# Patient Record
Sex: FEMALE | Race: OTHER | URBAN - METROPOLITAN AREA
[De-identification: names, ages, dates, MRNs, and addresses within clinical notes are randomized per-mention and may not be internally consistent; named-entity substitution may affect disease eponyms.]

---

## 2021-12-26 ENCOUNTER — OFFICE VISIT (OUTPATIENT)
Dept: URGENT CARE | Facility: CLINIC | Age: 36
End: 2021-12-26
Payer: COMMERCIAL

## 2021-12-26 VITALS
HEIGHT: 66 IN | HEART RATE: 66 BPM | DIASTOLIC BLOOD PRESSURE: 70 MMHG | BODY MASS INDEX: 23.3 KG/M2 | OXYGEN SATURATION: 100 % | WEIGHT: 145 LBS | TEMPERATURE: 98.2 F | SYSTOLIC BLOOD PRESSURE: 117 MMHG

## 2021-12-26 DIAGNOSIS — B34.9 VIRAL INFECTION: Primary | ICD-10-CM

## 2021-12-26 PROCEDURE — 99213 OFFICE O/P EST LOW 20 MIN: CPT | Performed by: PHYSICIAN ASSISTANT

## 2021-12-26 PROCEDURE — 87636 SARSCOV2 & INF A&B AMP PRB: CPT | Performed by: PHYSICIAN ASSISTANT

## 2021-12-28 LAB
FLUAV RNA RESP QL NAA+PROBE: NEGATIVE
FLUBV RNA RESP QL NAA+PROBE: NEGATIVE
SARS-COV-2 RNA RESP QL NAA+PROBE: POSITIVE

## 2022-05-21 ENCOUNTER — APPOINTMENT (OUTPATIENT)
Dept: RADIOLOGY | Facility: CLINIC | Age: 37
End: 2022-05-21
Payer: COMMERCIAL

## 2022-05-21 ENCOUNTER — OFFICE VISIT (OUTPATIENT)
Dept: URGENT CARE | Facility: CLINIC | Age: 37
End: 2022-05-21
Payer: COMMERCIAL

## 2022-05-21 VITALS
DIASTOLIC BLOOD PRESSURE: 70 MMHG | TEMPERATURE: 97.9 F | OXYGEN SATURATION: 100 % | HEART RATE: 89 BPM | SYSTOLIC BLOOD PRESSURE: 110 MMHG | RESPIRATION RATE: 18 BRPM

## 2022-05-21 DIAGNOSIS — M25.571 ACUTE RIGHT ANKLE PAIN: ICD-10-CM

## 2022-05-21 DIAGNOSIS — S93.401A SPRAIN OF RIGHT ANKLE, UNSPECIFIED LIGAMENT, INITIAL ENCOUNTER: ICD-10-CM

## 2022-05-21 DIAGNOSIS — M25.571 ACUTE RIGHT ANKLE PAIN: Primary | ICD-10-CM

## 2022-05-21 DIAGNOSIS — S63.502A SPRAIN OF LEFT WRIST, INITIAL ENCOUNTER: ICD-10-CM

## 2022-05-21 PROCEDURE — 73610 X-RAY EXAM OF ANKLE: CPT

## 2022-05-21 PROCEDURE — 73630 X-RAY EXAM OF FOOT: CPT

## 2022-05-21 PROCEDURE — 99213 OFFICE O/P EST LOW 20 MIN: CPT

## 2022-05-21 NOTE — PROGRESS NOTES
[facility]        NAME: Brigette Schumacher is a 29 y o  female  : 1985    MRN: [id number]  DATE: May 21, 2022  TIME: 11:16 AM    Assessment and Plan   Acute right ankle pain [M25 571]  1  Acute right ankle pain  XR ankle 3+ vw right    XR foot 3+ vw right    XR foot 3+ vw right   2  Sprain of left wrist, initial encounter     3  Sprain of right ankle, unspecified ligament, initial encounter       Xray right foot/ankle done today  Negative for fracture on my read  Will await final radiology read  Offered to xray left wrist, but patient will hold off today as she has normal ROM, no bony tenderness, and pain is improving  Patient Instructions     Rest and elevation  As symptoms improve you may use your splint less and apply an elastic bandage(ACE wrap) for support  Do not sleep with elastic bandage on  Ice for 15min, 3-4x daily  Tylenol OTC  PCP follow up  Return to clinic or follow up with Orthopedics with new or worsening symptoms such as pain, swelling, loss of color or sensation  Chief Complaint     Chief Complaint   Patient presents with    Ankle Injury     Happened on Tuesday while on vacation  Iced did not seek tx  + edema, + ecchymosis         History of Present Illness       Patient presents today after falling and injuring her right foot/ankle and left wrist 3 days ago  Patient unsure if she twisted her ankle when she fell  She also landed with her hand out injuring her left wrist  She has bruising to her inner ankle, and the hsieh aspect of her left wrist  She denies history of previous fractures to the left wrist or right ankle/foot  She states the pain is improving to her L wrist  She is able to bear some weight to her R ankle, but with pain  Since the injury she states the pain and swelling have improved slightly  Review of Systems   Review of Systems   Constitutional: Negative for chills and fever  Musculoskeletal: Positive for arthralgias and joint swelling  Negative for gait problem  Skin: Positive for color change  Negative for rash and wound  Neurological: Negative for weakness and numbness  All other systems reviewed and are negative  Current Medications     No current outpatient medications on file  Current Allergies     Allergies as of 05/21/2022    (No Known Allergies)            The following portions of the patient's history were reviewed and updated as appropriate: allergies, current medications, past family history, past medical history, past social history, past surgical history and problem list      History reviewed  No pertinent past medical history  History reviewed  No pertinent surgical history  Family History   Problem Relation Age of Onset    Hypertension Mother     Hypertension Father          Medications have been verified  Objective   /70   Pulse 89   Temp 97 9 °F (36 6 °C)   Resp 18   LMP 05/06/2022   SpO2 100%        Physical Exam     Physical Exam  Vitals and nursing note reviewed  Constitutional:       General: She is not in acute distress  Appearance: She is well-developed  She is not ill-appearing  HENT:      Nose: Nose normal    Eyes:      Pupils: Pupils are equal, round, and reactive to light  Cardiovascular:      Rate and Rhythm: Normal rate and regular rhythm  Pulses: Normal pulses  Heart sounds: Normal heart sounds  No murmur heard  No friction rub  No gallop  Pulmonary:      Effort: Pulmonary effort is normal  No respiratory distress  Breath sounds: Normal breath sounds  No wheezing or rales  Chest:      Chest wall: No tenderness  Abdominal:      General: Bowel sounds are normal    Musculoskeletal:         General: Tenderness (left wrist and right ankle) and signs of injury (left wrist and right ankle) present  No deformity  Normal range of motion  Right wrist: Normal       Left wrist: No swelling, tenderness or bony tenderness  Normal range of motion  Normal pulse  Arms:       Cervical back: Normal range of motion  Right ankle: Swelling and ecchymosis present  Tenderness present over the medial malleolus  Normal range of motion  Anterior drawer test negative  Normal pulse  Left ankle: Normal       Right foot: Bony tenderness present  Comments: LE MS 5/5 bilaterally  Negative talar tilt, anterior drawer and squeeze test    Skin:     General: Skin is warm  Findings: Bruising (palmar aspest of L wrist, and inner R ankle) present  No erythema or rash  Neurological:      Mental Status: She is alert and oriented to person, place, and time  Sensory: Sensation is intact  Deep Tendon Reflexes: Reflexes are normal and symmetric  Comments: LE light touch sensation intact bilaterally  Psychiatric:         Mood and Affect: Mood normal          Behavior: Behavior normal          Thought Content:  Thought content normal          Judgment: Judgment normal

## 2022-05-21 NOTE — PATIENT INSTRUCTIONS
Rest and elevation  As symptoms improve you may use your splint less and apply an elastic bandage(ACE wrap) for support  Do not sleep with elastic bandage on  Ice for 15min, 3-4x daily  Tylenol OTC  PCP follow up  Return to clinic or follow up with Orthopedics with new or worsening symptoms such as pain, swelling, loss of color or sensation  Ankle Sprain   AMBULATORY CARE:   An ankle sprain  happens when 1 or more ligaments in your ankle joint stretch or tear  Ligaments are tough tissues that connect bones  Ligaments support your joints and keep your bones in place  Common symptoms include the following:   Trouble moving your ankle or foot    Pain when you touch or put weight on your ankle    Bruised, swollen, or misshapen ankle    Seek care immediately if:   You have severe pain in your ankle  Your foot or toes are cold or numb  Your ankle becomes more weak or unstable (wobbly)  You are unable to put any weight on your ankle or foot  Your swelling has increased or returned  Call your doctor if:   Your pain does not go away, even after treatment  You have questions or concerns about your condition or care  Treatment:   Medicines:      NSAIDs , such as ibuprofen, help decrease swelling, pain, and fever  This medicine is available with or without a doctor's order  NSAIDs can cause stomach bleeding or kidney problems in certain people  If you take blood thinner medicine, always ask your healthcare provider if NSAIDs are safe for you  Always read the medicine label and follow directions  Acetaminophen  decreases pain and fever  It is available without a doctor's order  Ask how much to take and how often to take it  Follow directions  Read the labels of all other medicines you are using to see if they also contain acetaminophen, or ask your doctor or pharmacist  Acetaminophen can cause liver damage if not taken correctly   Do not use more than 4 grams (4,000 milligrams) total of acetaminophen in one day  Prescription pain medicine  may be given  Ask your healthcare provider how to take this medicine safely  Some prescription pain medicines contain acetaminophen  Do not take other medicines that contain acetaminophen without talking to your healthcare provider  Too much acetaminophen may cause liver damage  Prescription pain medicine may cause constipation  Ask your healthcare provider how to prevent or treat constipation  Surgery  may be needed to repair or replace a torn ligament if your sprain does not heal with other treatments  Your healthcare provider may use screws to attach the bones in your ankle together  The screws may help support your ankle and make it stable  Ask your healthcare provider for more information about surgery to treat your ankle sprain  Self-care:   Use support devices,  such as a brace, cast, or splint, to limit your movement and protect your joint  You may need to use crutches to decrease your pain as you move around  Go to physical therapy as directed  A physical therapist teaches you exercises to help improve movement and strength, and to decrease pain  Rest  your ankle so that it can heal  Return to normal activities as directed  Apply ice  on your ankle for 15 to 20 minutes every hour or as directed  Use an ice pack, or put crushed ice in a plastic bag  Cover it with a towel  Ice helps prevent tissue damage and decreases swelling and pain  Compress  your ankle  Ask if you should wrap an elastic bandage around your injured ligament  An elastic bandage provides support and helps decrease swelling and movement so your joint can heal  Wear as long as directed  Elevate  your ankle above the level of your heart as often as you can  This will help decrease swelling and pain  Prop your ankle on pillows or blankets to keep it elevated comfortably         Prevent another ankle sprain:   Let your ankle heal   Find out how long your ligament needs to heal  Do not do any physical activity until your healthcare provider says it is okay  If you start activity too soon, you may develop a more serious injury  Always warm up and stretch  before you exercise or play sports  Use the right equipment  Always wear shoes that fit well and are made for the activity that you are doing  You may also need ankle supports, elbow and knee pads, or braces  Follow up with your doctor as directed:  Write down your questions so you remember to ask them during your visits  © Copyright [employer] 2022 Information is for End User's use only and may not be sold, redistributed or otherwise used for commercial purposes  All illustrations and images included in CareNotes® are the copyrighted property of A [employer] , Inc  or [facility] Valerio Lino   The above information is an  only  It is not intended as medical advice for individual conditions or treatments  Talk to your doctor, nurse or pharmacist before following any medical regimen to see if it is safe and effective for you

## 2024-09-29 ENCOUNTER — OFFICE VISIT (OUTPATIENT)
Dept: URGENT CARE | Facility: CLINIC | Age: 39
End: 2024-09-29
Payer: COMMERCIAL

## 2024-09-29 VITALS
SYSTOLIC BLOOD PRESSURE: 133 MMHG | OXYGEN SATURATION: 100 % | WEIGHT: 133.4 LBS | DIASTOLIC BLOOD PRESSURE: 76 MMHG | HEART RATE: 69 BPM | BODY MASS INDEX: 21.53 KG/M2

## 2024-09-29 DIAGNOSIS — J06.9 ACUTE URI: Primary | ICD-10-CM

## 2024-09-29 DIAGNOSIS — J02.9 SORE THROAT: ICD-10-CM

## 2024-09-29 DIAGNOSIS — R05.1 ACUTE COUGH: ICD-10-CM

## 2024-09-29 LAB
S PYO AG THROAT QL: NEGATIVE
SARS-COV-2 AG UPPER RESP QL IA: NEGATIVE
VALID CONTROL: NORMAL

## 2024-09-29 PROCEDURE — 87880 STREP A ASSAY W/OPTIC: CPT | Performed by: PHYSICAL MEDICINE & REHABILITATION

## 2024-09-29 PROCEDURE — 99213 OFFICE O/P EST LOW 20 MIN: CPT | Performed by: PHYSICAL MEDICINE & REHABILITATION

## 2024-09-29 PROCEDURE — 87811 SARS-COV-2 COVID19 W/OPTIC: CPT | Performed by: PHYSICAL MEDICINE & REHABILITATION

## 2024-09-29 NOTE — PROGRESS NOTES
[facility]        NAME: Berkley Lee is a 39 y.o. female  : 1985    MRN: [id number]  DATE: 2024  TIME: 3:21 PM    Assessment and Plan   Acute URI [J06.9]  1. Acute URI        2. Sore throat  POCT rapid ANTIGEN strepA      3. Acute cough  Poct Covid 19 Rapid Antigen Test            Patient Instructions     Vitamin D3 2000 IU daily.  Vitamin C 1000mg twice per day.  Multivitamin daily.  Some studies suggest that Zinc 12.5-15mg every 2 hours while awake x 5 days may shorten the duration cold symptoms by 1-2 days.   Fluids and rest.  Nasal saline spray; Afrin if severe congestion (do not use for more than 3 days)  Over the counter cough/cold medications as needed.   Flonase nasal spray.  Tylenol/Ibuprofen for pain/fever.  Salt water gargles and/or chloraseptic spray.  Warm tea with honey.  Warm compresses over sinuses.  Nasal rinses with distilled water.    Common symptoms and over the counter treatments:  For congestion: antihistamines for decongestants or allergy relief include: Benadryl, brompheniramine (Dimetapp),  doxylamine  Decongestant: Pseudoephedrine   Fever control: Acetaminophen or Ibuprofen   Cough suppressant- when your cough is dry : Dextromethorphan (Delysm, Robitussin)  Cough expectorant- when your cough is productive/wet: guaifenesin  (Mucinex)    Follow up with PCP in 3-5 days.  Proceed to  ER if symptoms worsen.    If tests are performed, our office will contact you with results only if changes need to made to the care plan discussed with you at the visit. [facility]    Chief Complaint     Chief Complaint   Patient presents with    Cold Like Symptoms     Sore throat, nasal congestion, dry mouth, dry cough. Has been exposed to a coworker with COVID. Would like to be tested for COVID and Strep.          History of Present Illness       Patient presenting with sore throat, nasal congestion, dry mouth and cough, she was exposed  to COVID and strep. She states symptoms started today 9/29/24.        Review of Systems   Review of Systems   Constitutional: Negative.    HENT:  Positive for congestion and sore throat.    Respiratory:  Positive for cough.    Cardiovascular: Negative.    Gastrointestinal: Negative.    Musculoskeletal: Negative.          Current Medications     No current outpatient medications on file.    Current Allergies     Allergies as of 09/29/2024    (No Known Allergies)            The following portions of the patient's history were reviewed and updated as appropriate: allergies, current medications, past family history, past medical history, past social history, past surgical history and problem list.     History reviewed. No pertinent past medical history.    History reviewed. No pertinent surgical history.    Family History   Problem Relation Age of Onset    Hypertension Mother     Hypertension Father          Medications have been verified.        Objective   /76   Pulse 100   Wt 60.5 kg (133 lb 6.4 oz)   SpO2 (!) 69%   BMI 21.53 kg/m²        Physical Exam     Physical Exam  Constitutional:       General: She is not in acute distress.     Appearance: She is not ill-appearing.   HENT:      Right Ear: Tympanic membrane normal.      Left Ear: Tympanic membrane normal.      Nose: Rhinorrhea present. No congestion.      Mouth/Throat:      Mouth: Mucous membranes are moist.      Pharynx: Oropharynx is clear. Posterior oropharyngeal erythema present. No oropharyngeal exudate.   Eyes:      Conjunctiva/sclera: Conjunctivae normal.   Cardiovascular:      Rate and Rhythm: Normal rate and regular rhythm.      Heart sounds: Normal heart sounds.   Pulmonary:      Effort: Pulmonary effort is normal. No respiratory distress.      Breath sounds: Normal breath sounds. No wheezing, rhonchi or rales.   Musculoskeletal:      Cervical back: Normal range of motion and neck supple.   Lymphadenopathy:      Cervical: No cervical  adenopathy.   Skin:     General: Skin is warm.   Neurological:      Mental Status: She is alert.   Psychiatric:         Mood and Affect: Mood normal.         Behavior: Behavior normal.